# Patient Record
Sex: FEMALE | Race: WHITE
[De-identification: names, ages, dates, MRNs, and addresses within clinical notes are randomized per-mention and may not be internally consistent; named-entity substitution may affect disease eponyms.]

---

## 2018-02-06 ENCOUNTER — HOSPITAL ENCOUNTER (EMERGENCY)
Dept: HOSPITAL 11 - JP.ED | Age: 48
Discharge: HOME | End: 2018-02-06
Payer: MEDICAID

## 2018-02-06 VITALS — DIASTOLIC BLOOD PRESSURE: 73 MMHG | SYSTOLIC BLOOD PRESSURE: 147 MMHG

## 2018-02-06 DIAGNOSIS — Z79.899: ICD-10-CM

## 2018-02-06 DIAGNOSIS — I10: ICD-10-CM

## 2018-02-06 DIAGNOSIS — Z88.1: ICD-10-CM

## 2018-02-06 DIAGNOSIS — Z98.84: ICD-10-CM

## 2018-02-06 DIAGNOSIS — D64.9: ICD-10-CM

## 2018-02-06 DIAGNOSIS — Z72.0: ICD-10-CM

## 2018-02-06 DIAGNOSIS — I49.3: Primary | ICD-10-CM

## 2018-02-06 NOTE — EDM.PDOC
ED HPI GENERAL MEDICAL PROBLEM





- General


Chief Complaint: Cardiovascular Problem


Stated Complaint: IRREGULAR HEARTBEAT


Time Seen by Provider: 18 11:04


Source of Information: Reports: Patient


History Limitations: Reports: No Limitations





- History of Present Illness


INITIAL COMMENTS - FREE TEXT/NARRATIVE: 





pt was sent from the clinic because she was having sig irregularities. She was 

found to have pulse in the 40s so she was not perfusing  the ectopics. She has 

not had chest pain. She was at the clinic  for iron infusion. 


Onset: Today


Duration: Hour(s):


Location: Reports: Chest, Other (pt was noted to have sig pvcs. )


Associated Symptoms: Reports: No Other Symptoms





- Related Data


 Allergies











Allergy/AdvReac Type Severity Reaction Status Date / Time


 


cefoxitin sodium Allergy  Hives Verified 18 10:46





[From Mefoxin]     











Home Meds: 


 Home Meds





Calcium Citrate/Vitamin D2 [Calcium Citrate with Vit D] 1 each PO BID 14 [

History]


Cyanocobalamin (Vitamin B-12) [Vitamin B-12] 1,000 mcg SL DAILY 14 [

History]


Cyclobenzaprine [Flexeril] 5 mg PO BEDTIME PRN 14 [History]


Magnesium Glycinate [Mag Glycinate] 250 mg PO DAILY 14 [History]


Multivitamin [Multi-Vitamin Daily] 1 each PO BID 14 [History]


Potassium Gluconate 550 mg PO DAILY 14 [History]


Vitamin B Complex 100 mg PO DAILY 14 [History]


rOPINIRole HCl [Requip] 0.5 mg PO BEDTIME PRN 14 [History]


Folic Acid 1 mg PO DAILY 06/05/15 [History]


Ondansetron [Zofran ODT] 4 mg PO Q4H PRN 06/05/15 [History]


traMADol HCl [Ultram] 50 - 100 mg PO Q6H 06/05/15 [History]


Hydrochlorothiazide 12.5 mg PO DAILY 18 [History]


Vitamin B6-pyridOXINE [Vitamin B6] 100 mg SUBCUT ASDIRECTED 18 [History]











Past Medical History


Cardiovascular History: Reports: Hypertension


Gastrointestinal History: Reports: Bowel Obstruction, Cholelithiasis


OB/GYN History: Reports: Pregnancy


Musculoskeletal History: Reports: Back Pain, Chronic


Hematologic History: Reports: B12 Deficiency





- Past Surgical History


HEENT Surgical History: Reports: Tonsillectomy


GI Surgical History: Reports: Bariatric Procedure, Cholecystectomy, EGD, Hernia 

Repair/Other, Other (See Below)


Other GI Surgeries/Procedures: multiple stomach surgies.


Female  Surgical History: Reports:  Section, Tubal Ligation





Social & Family History





- Family History


HEENT: Reports: Cataract, Glaucoma, Hearing Impairment, Impaired Vision, 

Retinal Detachment


Cardiac: Reports: Hypertension


Respiratory: Reports: Asthma


GI: Reports: Bowel Obstruction, Cholelithiasis


OBGYN: Reports: Dysfunctional uterine bleeding, Ectopic Pregnancy, Fibroids, 

Pregnancy, Recurrent Spontaneous 


Musculoskeletal: Reports: Arthritis, Fibromyalgia, Osteoarthritis


Psychiatric: Reports: Abuse, Victim of, ADHD, Anxiety, Bipolar, Developmental 

Delay, Learning Disability, Panic Attack, Suicide Attempt


Endocrine/Metabolic: Reports: Diabetes, type II, Hypothyroidism, Obesity/MBI 30+


Hematologic: Reports: Anemia, Anesthesia Reaction


Oncologic: Reports: Colon, Leukemia, Lung





- Tobacco Use


Smoking Status *Q: Heavy Tobacco Smoker


Years of Tobacco use: 30


Packs/Tins Daily: 1


Used Tobacco, but Quit: No


Month Tobacco Last Used: 2015


Second Hand Smoke Exposure: No





- Caffeine Use


Caffeine Use: Reports: None





- Alcohol Use


Days Per Week of Alcohol Use: 0





- Recreational Drug Use


Recreational Drug Use: No





ED ROS GENERAL





- Review of Systems


Review Of Systems: See Below


Constitutional: Reports: No Symptoms


HEENT: Reports: No Symptoms


Respiratory: Reports: No Symptoms


Cardiovascular: Reports: Palpitations, Other (pt was noted to have frequent 

pvcs. )


Endocrine: Reports: No Symptoms


GI/Abdominal: Reports: No Symptoms


: Reports: No Symptoms


Musculoskeletal: Reports: No Symptoms


Skin: Reports: No Symptoms


Neurological: Reports: No Symptoms


Hematologic/Lymphatic: Reports: Anemia, Other (pt is receiving fe infuaions at 

the clinic for her anemia. )


Immunologic: Reports: No Symptoms





ED EXAM, GENERAL





- Physical Exam


Exam: See Below


Free Text/Narrative:: 





pt arrived with a history of frequent pvcs. She had a perfusing pulse at 40,  

Pt has not had chest pain. 


Exam Limited By: No Limitations


General Appearance: Alert, Anxious, Mild Distress


Ears: Normal TMs


Nose: Normal Inspection


Throat/Mouth: Normal Inspection


Head: Atraumatic


Neck: Normal Inspection


Respiratory/Chest: No Respiratory Distress


Cardiovascular: Other (pt is having frquent pvcs. )


GI/Abdominal: Soft, Non-Tender, Other (pt is post gastric bypass and has lost 

about 100 lbs. )


 (Female) Exam: Deferred


Rectal (Female) Exam: Deferred


Back Exam: Normal Inspection


Extremities: Normal Inspection


Neurological: Alert, Oriented, Normal Cognition


Psychiatric: Normal Affect





Course





- Vital Signs


Last Recorded V/S: 


 Last Vital Signs











Temp  36.4 C   18 10:43


 


Pulse  71   18 10:43


 


Resp  13   18 10:43


 


BP  147/73 H  18 10:43


 


Pulse Ox  100   18 10:43














- Orders/Labs/Meds


Orders: 


 Active Orders 24 hr











 Category Date Time Status


 


 EKG Documentation Completion [RC] ASDIRECTED Care  18 11:01 Active


 


 UA W/MICROSCOPIC [URIN] Urgent Lab  18 11:00 Uncollected


 


 EKG 12 Lead [EK] Routine Ther  18 11:01 Ordered


 


 Holter Monitor 48 Hr [EK] Routine Ther  18 12:12 Ordered











Labs: 


 Laboratory Tests











  18 Range/Units





  11:06 11:06 11:06 


 


WBC  7.1    (4.5-11.0)  K/uL


 


RBC  4.31    (3.30-5.50)  M/uL


 


Hgb  11.8 L    (12.0-15.0)  g/dL


 


Hct  37.3    (36.0-48.0)  %


 


MCV  87    (80-98)  fL


 


MCH  27    (27-31)  pg


 


MCHC  32    (32-36)  %


 


Plt Count  274    (150-400)  K/uL


 


Neut % (Auto)  70 H    (36-66)  %


 


Lymph % (Auto)  20 L    (24-44)  %


 


Mono % (Auto)  6    (2-6)  %


 


Eos % (Auto)  3    (2-4)  %


 


Baso % (Auto)  1    (0-1)  %


 


Sodium   141   (140-148)  mmol/L


 


Potassium   3.8   (3.6-5.2)  mmol/L


 


Chloride   108   (100-108)  mmol/L


 


Carbon Dioxide   23   (21-32)  mmol/L


 


Anion Gap   9.7   (5.0-14.0)  mmol/L


 


BUN   17   (7-18)  mg/dL


 


Creatinine   1.0   (0.6-1.0)  mg/dL


 


Est Cr Clr Drug Dosing   61.91   mL/min


 


Estimated GFR (MDRD)   59 L   (>60)  


 


Glucose   88   ()  mg/dL


 


Calcium   8.2 L   (8.5-10.1)  mg/dL


 


Magnesium     (1.8-2.4)  mg/dL


 


Total Bilirubin   0.2  D   (0.2-1.0)  mg/dL


 


AST   18   (15-37)  U/L


 


ALT   23   (12-78)  U/L


 


Alkaline Phosphatase   86   ()  U/L


 


Troponin I    < 0.017  (0.000-0.056)  ng/mL


 


Total Protein   5.8 L   (6.4-8.2)  g/dL


 


Albumin   3.5   (3.4-5.0)  g/dL


 


Globulin   2.3   (2.3-3.5)  g/dL


 


Albumin/Globulin Ratio   1.5   (1.2-2.2)  














  18 Range/Units





  11:06 


 


WBC   (4.5-11.0)  K/uL


 


RBC   (3.30-5.50)  M/uL


 


Hgb   (12.0-15.0)  g/dL


 


Hct   (36.0-48.0)  %


 


MCV   (80-98)  fL


 


MCH   (27-31)  pg


 


MCHC   (32-36)  %


 


Plt Count   (150-400)  K/uL


 


Neut % (Auto)   (36-66)  %


 


Lymph % (Auto)   (24-44)  %


 


Mono % (Auto)   (2-6)  %


 


Eos % (Auto)   (2-4)  %


 


Baso % (Auto)   (0-1)  %


 


Sodium   (140-148)  mmol/L


 


Potassium   (3.6-5.2)  mmol/L


 


Chloride   (100-108)  mmol/L


 


Carbon Dioxide   (21-32)  mmol/L


 


Anion Gap   (5.0-14.0)  mmol/L


 


BUN   (7-18)  mg/dL


 


Creatinine   (0.6-1.0)  mg/dL


 


Est Cr Clr Drug Dosing   mL/min


 


Estimated GFR (MDRD)   (>60)  


 


Glucose   ()  mg/dL


 


Calcium   (8.5-10.1)  mg/dL


 


Magnesium  1.8  (1.8-2.4)  mg/dL


 


Total Bilirubin   (0.2-1.0)  mg/dL


 


AST   (15-37)  U/L


 


ALT   (12-78)  U/L


 


Alkaline Phosphatase   ()  U/L


 


Troponin I   (0.000-0.056)  ng/mL


 


Total Protein   (6.4-8.2)  g/dL


 


Albumin   (3.4-5.0)  g/dL


 


Globulin   (2.3-3.5)  g/dL


 


Albumin/Globulin Ratio   (1.2-2.2)  














- Re-Assessments/Exams


Free Text/Narrative Re-Assessment/Exam: 





18 12:13


pt arrived with a history of having ectopics every other beat when she first 

arrived. This has now improved. She did not have chest pain. 


18 12:14


 Her electrolytes and cardiac enzymes are normal, Her ekg looked good. Her mag  

was normal. 





Departure





- Departure


Time of Disposition: 12:16


Disposition: Home, Self-Care 01


Condition: Fair


Clinical Impression: 


 Ventricular ectopics, Anemia





Referrals: 


Luis Armando Brito MD [Primary Care Provider] - 


Forms:  ED Department Discharge


Care Plan Goals: 


48 hour holter monitor, pt may have her fe infusion, copy labs and ekg and 

rhythm strips for her to take to her visit with Dr Brito. 





- My Orders


Last 24 Hours: 


My Active Orders





18 11:00


UA W/MICROSCOPIC [URIN] Urgent 





18 11:01


EKG Documentation Completion [RC] ASDIRECTED 


EKG 12 Lead [EK] Routine 





18 12:12


Holter Monitor 48 Hr [EK] Routine 














- Assessment/Plan


Last 24 Hours: 


My Active Orders





18 11:00


UA W/MICROSCOPIC [URIN] Urgent 





18 11:01


EKG Documentation Completion [RC] ASDIRECTED 


EKG 12 Lead [EK] Routine 





18 12:12


Holter Monitor 48 Hr [EK] Routine

## 2020-01-08 NOTE — OR
DATE OF PROCEDURE:  01/03/2020

 

SURGEON:  Yury Angulo MD

 

PREOPERATIVE DIAGNOSIS:  Indications for screening colonoscopy.

 

POSTOPERATIVE DIAGNOSIS:  Normal colonoscopic examination.

 

OPERATIVE PROCEDURE:  Flexible colonoscopy.

 

ANESTHESIA:  IV sedation.

 

INDICATION FOR PROCEDURE:  A 49-year-old presenting for initial colonoscopy, does have a

family history of maternal grandmother having colon carcinoma.  Plan is to proceed with a

flexible colonoscopy with biopsies and/or polypectomy as indicated.  Potential risks

including bleeding and perforation were discussed, and the patient wishes to proceed.

 

DETAILS OF PROCEDURE:  The patient was taken to the operating room and placed in the left

lateral decubitus position.  IV sedation was administered after which the initial digital

rectal exam was performed and was unremarkable.  Colonoscope was then passed to the level of

the cecum.  The patient did have elongated and tortuous colon, but we were eventually able

to reach the cecum, and the ileocecal valve was visualized.  The prep generally was fairly

good with only a small amount of liquid stool present.  Apart from the elongation and

tortuosity of the colon, no abnormalities were noted.  Specifically, there were no areas of

diverticular disease, no areas of colitis, and no polyps or other signs of neoplasia.  The

scope was then withdrawn, the above findings reconfirmed, and the procedure then concluded.

 

RECOMMENDATIONS:  This patient will need repeat colonoscopy in 5 years given the family

history of colon carcinoma.

 

 

 

 

Yury Angulo MD

DD:  01/05/2020 22:14:24

DT:  01/08/2020 12:16:30

Job #:  [id number]